# Patient Record
Sex: FEMALE
[De-identification: names, ages, dates, MRNs, and addresses within clinical notes are randomized per-mention and may not be internally consistent; named-entity substitution may affect disease eponyms.]

---

## 2020-06-03 PROBLEM — Z00.00 ENCOUNTER FOR PREVENTIVE HEALTH EXAMINATION: Status: ACTIVE | Noted: 2020-06-03

## 2020-06-04 ENCOUNTER — APPOINTMENT (OUTPATIENT)
Dept: RHEUMATOLOGY | Facility: CLINIC | Age: 50
End: 2020-06-04
Payer: COMMERCIAL

## 2020-06-04 DIAGNOSIS — Z82.61 FAMILY HISTORY OF ARTHRITIS: ICD-10-CM

## 2020-06-04 DIAGNOSIS — E11.9 TYPE 2 DIABETES MELLITUS W/OUT COMPLICATIONS: ICD-10-CM

## 2020-06-04 DIAGNOSIS — E03.9 HYPOTHYROIDISM, UNSPECIFIED: ICD-10-CM

## 2020-06-04 PROCEDURE — 99203 OFFICE O/P NEW LOW 30 MIN: CPT | Mod: 95

## 2020-06-04 RX ORDER — ACETAMINOPHEN 500 MG
TABLET ORAL
Refills: 0 | Status: ACTIVE | COMMUNITY

## 2020-06-04 RX ORDER — LEVOTHYROXINE SODIUM 137 UG/1
TABLET ORAL
Refills: 0 | Status: ACTIVE | COMMUNITY

## 2020-06-04 RX ORDER — PNV NO.95/FERROUS FUM/FOLIC AC 28MG-0.8MG
TABLET ORAL
Refills: 0 | Status: ACTIVE | COMMUNITY

## 2020-08-21 ENCOUNTER — APPOINTMENT (OUTPATIENT)
Dept: RHEUMATOLOGY | Facility: CLINIC | Age: 50
End: 2020-08-21
Payer: MEDICAID

## 2020-08-21 VITALS
HEART RATE: 83 BPM | SYSTOLIC BLOOD PRESSURE: 134 MMHG | TEMPERATURE: 99 F | BODY MASS INDEX: 29.99 KG/M2 | DIASTOLIC BLOOD PRESSURE: 82 MMHG | OXYGEN SATURATION: 97 % | HEIGHT: 65 IN | WEIGHT: 180 LBS

## 2020-08-21 PROCEDURE — 99214 OFFICE O/P EST MOD 30 MIN: CPT

## 2020-08-21 RX ORDER — DULOXETINE HYDROCHLORIDE 30 MG/1
30 CAPSULE, DELAYED RELEASE PELLETS ORAL
Qty: 30 | Refills: 2 | Status: DISCONTINUED | COMMUNITY
Start: 2020-06-04 | End: 2020-08-21

## 2020-12-01 ENCOUNTER — APPOINTMENT (OUTPATIENT)
Dept: RHEUMATOLOGY | Facility: CLINIC | Age: 50
End: 2020-12-01
Payer: MEDICAID

## 2020-12-01 VITALS
HEIGHT: 65 IN | BODY MASS INDEX: 29.99 KG/M2 | HEART RATE: 77 BPM | SYSTOLIC BLOOD PRESSURE: 136 MMHG | DIASTOLIC BLOOD PRESSURE: 82 MMHG | WEIGHT: 180 LBS | OXYGEN SATURATION: 99 % | TEMPERATURE: 99.6 F

## 2020-12-01 PROCEDURE — 99072 ADDL SUPL MATRL&STAF TM PHE: CPT

## 2020-12-01 PROCEDURE — 99214 OFFICE O/P EST MOD 30 MIN: CPT

## 2020-12-01 RX ORDER — LEVOTHYROXINE SODIUM 0.05 MG/1
50 TABLET ORAL
Qty: 30 | Refills: 0 | Status: COMPLETED | COMMUNITY
Start: 2020-09-09

## 2020-12-01 RX ORDER — METFORMIN ER 500 MG 500 MG/1
500 TABLET ORAL
Qty: 120 | Refills: 0 | Status: ACTIVE | COMMUNITY
Start: 2020-09-26

## 2020-12-01 RX ORDER — TRAZODONE HYDROCHLORIDE 50 MG/1
50 TABLET ORAL
Qty: 90 | Refills: 0 | Status: COMPLETED | COMMUNITY
Start: 2020-10-13

## 2020-12-01 RX ORDER — CETIRIZINE HYDROCHLORIDE 10 MG/1
10 TABLET, COATED ORAL
Qty: 30 | Refills: 0 | Status: COMPLETED | COMMUNITY
Start: 2020-08-23

## 2020-12-01 RX ORDER — AMOXICILLIN AND CLAVULANATE POTASSIUM 500; 125 MG/1; MG/1
500-125 TABLET, FILM COATED ORAL
Qty: 14 | Refills: 0 | Status: COMPLETED | COMMUNITY
Start: 2020-10-20

## 2020-12-01 RX ORDER — ETODOLAC 500 MG/1
500 TABLET, FILM COATED, EXTENDED RELEASE ORAL
Qty: 30 | Refills: 1 | Status: DISCONTINUED | COMMUNITY
Start: 2020-06-04 | End: 2020-12-01

## 2020-12-01 RX ORDER — LEVOTHYROXINE SODIUM 0.03 MG/1
25 TABLET ORAL
Qty: 30 | Refills: 0 | Status: COMPLETED | COMMUNITY
Start: 2020-09-26

## 2020-12-01 RX ORDER — ASPIRIN 81 MG
81 TABLET, DELAYED RELEASE (ENTERIC COATED) ORAL
Refills: 0 | Status: DISCONTINUED | COMMUNITY
End: 2020-12-01

## 2020-12-01 RX ORDER — ERGOCALCIFEROL 1.25 MG/1
1.25 MG CAPSULE, LIQUID FILLED ORAL
Qty: 8 | Refills: 0 | Status: ACTIVE | COMMUNITY
Start: 2020-09-09

## 2020-12-01 RX ORDER — LEVOTHYROXINE SODIUM 0.07 MG/1
75 TABLET ORAL
Qty: 30 | Refills: 0 | Status: ACTIVE | COMMUNITY
Start: 2020-11-27

## 2020-12-01 RX ORDER — FLUTICASONE FUROATE AND VILANTEROL TRIFENATATE 200; 25 UG/1; UG/1
200-25 POWDER RESPIRATORY (INHALATION)
Qty: 60 | Refills: 0 | Status: ACTIVE | COMMUNITY
Start: 2020-10-19

## 2020-12-01 RX ORDER — IRBESARTAN 150 MG/1
150 TABLET ORAL
Qty: 90 | Refills: 0 | Status: ACTIVE | COMMUNITY
Start: 2020-10-13

## 2020-12-01 RX ORDER — PIOGLITAZONE HYDROCHLORIDE 45 MG/1
45 TABLET ORAL
Qty: 30 | Refills: 0 | Status: COMPLETED | COMMUNITY
Start: 2020-05-07

## 2020-12-01 RX ORDER — BLOOD SUGAR DIAGNOSTIC
STRIP MISCELLANEOUS
Qty: 100 | Refills: 0 | Status: COMPLETED | COMMUNITY
Start: 2020-09-13

## 2020-12-01 RX ORDER — FLUTICASONE PROPIONATE 50 UG/1
50 SPRAY, METERED NASAL
Qty: 16 | Refills: 0 | Status: COMPLETED | COMMUNITY
Start: 2020-04-01

## 2020-12-01 RX ORDER — ASPIRIN 81 MG/1
81 TABLET, COATED ORAL
Qty: 30 | Refills: 0 | Status: ACTIVE | COMMUNITY
Start: 2020-07-11

## 2020-12-01 RX ORDER — FEXOFENADINE HYDROCHLORIDE 180 MG/1
180 TABLET ORAL
Qty: 30 | Refills: 0 | Status: COMPLETED | COMMUNITY
Start: 2020-05-10

## 2020-12-01 RX ORDER — EMPAGLIFLOZIN 25 MG/1
25 TABLET, FILM COATED ORAL
Qty: 30 | Refills: 0 | Status: ACTIVE | COMMUNITY
Start: 2020-09-06

## 2020-12-01 RX ORDER — INSULIN DEGLUDEC INJECTION 200 U/ML
200 INJECTION, SOLUTION SUBCUTANEOUS
Qty: 3 | Refills: 0 | Status: ACTIVE | COMMUNITY
Start: 2020-09-17

## 2021-01-08 NOTE — PHYSICAL EXAM
[General Appearance - Alert] : alert [General Appearance - In No Acute Distress] : in no acute distress [General Appearance - Well Nourished] : well nourished [General Appearance - Well Developed] : well developed [General Appearance - Well-Appearing] : healthy appearing [Sclera] : the sclera and conjunctiva were normal [Extraocular Movements] : extraocular movements were intact [Outer Ear] : the ears and nose were normal in appearance [Hearing Threshold Finger Rub Not [place]] : hearing was normal [Examination Of The Oral Cavity] : the lips and gums were normal [Neck Appearance] : the appearance of the neck was normal [Respiration, Rhythm And Depth] : normal respiratory rhythm and effort [FreeTextEntry1] : Tender knee ROM, tender hip flexion/external rotation [Skin Color & Pigmentation] : normal skin color and pigmentation [] : no rash [No Focal Deficits] : no focal deficits [Oriented To Time, Place, And Person] : oriented to person, place, and time [Impaired Insight] : insight and judgment were intact [Affect] : the affect was normal [Mood] : the mood was normal

## 2021-01-08 NOTE — ASSESSMENT
[FreeTextEntry1] : 50y F with chronic increasing polyarthralgia previously much more obese but recent wt loss that has plateaued due to her pain. She has a history of DM2 as well with possibly some neuropathy. Regardless, from her history and visual exam, there appears to be no inflammatory arthritis and her changes appear degenerative in nature, most likley c/w degenerative osteoarthritis of her hips and knees, mostly on weight bearing joint. She has mild hand pain at times. Labs and serologies show no systemic inflammatory autoimmune rheumatic disease process\par She has had little benefit of APAP and OTC NSAIDs for her knee symptoms. I recommend a SNRI and prn Rx NSAID to help control her pain at this time to increase mobility and well-being.\par She did not tolerate duloxetine, will give trial alternative agent.\par \par Returns now with mostly lower back pain, no worrisome symptoms, < 6 weeks. Will give NSAID as needed\par If no improvements, consider PT and XR imaging if persistent more than 3 months\par \par RTO 3 mos

## 2021-01-08 NOTE — HISTORY OF PRESENT ILLNESS
[FreeTextEntry1] : Did not tolerate SNRIs\par Now c/o mostly back pain, <6 weeks, no radiculopathy or other neuro signs\par Paraspinal spasm but mostly aching, pain 3-6/10 [de-identified] : \par \par All other ROS negative except as mentioned in HPI.

## 2021-10-01 ENCOUNTER — APPOINTMENT (OUTPATIENT)
Dept: RHEUMATOLOGY | Facility: CLINIC | Age: 51
End: 2021-10-01
Payer: MEDICAID

## 2021-10-01 VITALS
HEIGHT: 66 IN | BODY MASS INDEX: 28.61 KG/M2 | HEART RATE: 73 BPM | SYSTOLIC BLOOD PRESSURE: 146 MMHG | OXYGEN SATURATION: 98 % | WEIGHT: 178 LBS | DIASTOLIC BLOOD PRESSURE: 81 MMHG

## 2021-10-01 PROCEDURE — 99214 OFFICE O/P EST MOD 30 MIN: CPT

## 2021-10-01 NOTE — ASSESSMENT
[FreeTextEntry1] : 50y F with chronic increasing polyarthralgia previously much more obese but recent wt loss that has plateaued due to her pain. She has a history of DM2 as well with possibly some neuropathy. Regardless, from her history and visual exam, there appears to be no inflammatory arthritis and her changes appear degenerative in nature, most likley c/w degenerative osteoarthritis of her hips and knees, mostly on weight bearing joint. She has mild hand pain at times. Labs and serologies show no systemic inflammatory autoimmune rheumatic disease process\par She has had little benefit of APAP and OTC NSAIDs for her knee symptoms. I recommend a SNRI and prn Rx NSAID to help control her pain at this time to increase mobility and well-being.\par She did not tolerate duloxetine, will give trial alternative agent.\par \par Returns now with mostly lower back pain, no worrisome symptoms, 1 year. Taking methocarbamol with slight relief. Notes mild RLE radiculopathy and pain in bilateral hips. Known OA in hips and knees, appearing more radicular and component of OA as well. \par \par - XR lumbar w/ 6 view, bend/flex and ext\par - consider MR after\par - PT lumbar spine\par \par RTO 3 mos

## 2021-10-01 NOTE — PHYSICAL EXAM
[General Appearance - Alert] : alert [General Appearance - In No Acute Distress] : in no acute distress [General Appearance - Well Nourished] : well nourished [General Appearance - Well Developed] : well developed [General Appearance - Well-Appearing] : healthy appearing [Sclera] : the sclera and conjunctiva were normal [Extraocular Movements] : extraocular movements were intact [Outer Ear] : the ears and nose were normal in appearance [Hearing Threshold Finger Rub Not [place]] : hearing was normal [Examination Of The Oral Cavity] : the lips and gums were normal [Neck Appearance] : the appearance of the neck was normal [Respiration, Rhythm And Depth] : normal respiratory rhythm and effort [Skin Color & Pigmentation] : normal skin color and pigmentation [] : no rash [No Focal Deficits] : no focal deficits [Oriented To Time, Place, And Person] : oriented to person, place, and time [Impaired Insight] : insight and judgment were intact [Affect] : the affect was normal [Mood] : the mood was normal [FreeTextEntry1] : Tender knee ROM, tender hip flexion/external rotation

## 2021-10-01 NOTE — HISTORY OF PRESENT ILLNESS
[FreeTextEntry1] : Did not tolerate SNRIs\par Now c/o mostly back pain, <6 weeks, no radiculopathy or other neuro signs\par Paraspinal spasm but mostly aching, pain 3-6/10 [de-identified] : \par \par All other ROS negative except as mentioned in HPI.

## 2021-11-12 ENCOUNTER — APPOINTMENT (OUTPATIENT)
Dept: RHEUMATOLOGY | Facility: CLINIC | Age: 51
End: 2021-11-12
Payer: MEDICAID

## 2021-11-12 VITALS
RESPIRATION RATE: 18 BRPM | HEART RATE: 78 BPM | OXYGEN SATURATION: 98 % | SYSTOLIC BLOOD PRESSURE: 115 MMHG | BODY MASS INDEX: 28.12 KG/M2 | WEIGHT: 175 LBS | DIASTOLIC BLOOD PRESSURE: 73 MMHG | HEIGHT: 66 IN

## 2021-11-12 PROCEDURE — 99214 OFFICE O/P EST MOD 30 MIN: CPT

## 2021-11-26 ENCOUNTER — APPOINTMENT (OUTPATIENT)
Dept: MRI IMAGING | Facility: CLINIC | Age: 51
End: 2021-11-26
Payer: MEDICAID

## 2021-11-26 PROCEDURE — 72148 MRI LUMBAR SPINE W/O DYE: CPT

## 2021-12-17 ENCOUNTER — APPOINTMENT (OUTPATIENT)
Dept: RHEUMATOLOGY | Facility: CLINIC | Age: 51
End: 2021-12-17
Payer: MEDICAID

## 2021-12-17 VITALS
DIASTOLIC BLOOD PRESSURE: 79 MMHG | SYSTOLIC BLOOD PRESSURE: 125 MMHG | HEIGHT: 66 IN | OXYGEN SATURATION: 98 % | WEIGHT: 175 LBS | HEART RATE: 67 BPM | BODY MASS INDEX: 28.12 KG/M2 | TEMPERATURE: 96.6 F

## 2021-12-17 PROCEDURE — 99213 OFFICE O/P EST LOW 20 MIN: CPT

## 2022-02-11 ENCOUNTER — APPOINTMENT (OUTPATIENT)
Dept: RHEUMATOLOGY | Facility: CLINIC | Age: 52
End: 2022-02-11
Payer: MEDICAID

## 2022-02-11 VITALS
WEIGHT: 175 LBS | OXYGEN SATURATION: 99 % | BODY MASS INDEX: 28.12 KG/M2 | HEIGHT: 66 IN | DIASTOLIC BLOOD PRESSURE: 82 MMHG | HEART RATE: 76 BPM | SYSTOLIC BLOOD PRESSURE: 135 MMHG

## 2022-02-11 DIAGNOSIS — M79.662 PAIN IN LEFT LOWER LEG: ICD-10-CM

## 2022-02-11 PROCEDURE — 99214 OFFICE O/P EST MOD 30 MIN: CPT

## 2022-02-11 RX ORDER — NABUMETONE 750 MG/1
750 TABLET, FILM COATED ORAL
Qty: 60 | Refills: 2 | Status: DISCONTINUED | COMMUNITY
Start: 2020-12-01 | End: 2022-02-11

## 2022-05-13 ENCOUNTER — APPOINTMENT (OUTPATIENT)
Dept: RHEUMATOLOGY | Facility: CLINIC | Age: 52
End: 2022-05-13
Payer: MEDICAID

## 2022-05-13 VITALS
HEIGHT: 66 IN | BODY MASS INDEX: 28.12 KG/M2 | HEART RATE: 77 BPM | TEMPERATURE: 97.3 F | OXYGEN SATURATION: 98 % | DIASTOLIC BLOOD PRESSURE: 69 MMHG | WEIGHT: 175 LBS | SYSTOLIC BLOOD PRESSURE: 128 MMHG

## 2022-05-13 DIAGNOSIS — E66.3 OVERWEIGHT: ICD-10-CM

## 2022-05-13 PROCEDURE — 99214 OFFICE O/P EST MOD 30 MIN: CPT

## 2022-05-13 RX ORDER — VENLAFAXINE HYDROCHLORIDE 37.5 MG/1
37.5 CAPSULE, EXTENDED RELEASE ORAL
Qty: 90 | Refills: 3 | Status: DISCONTINUED | COMMUNITY
Start: 2020-08-21 | End: 2022-05-13

## 2022-05-13 RX ORDER — METHOCARBAMOL 750 MG/1
750 TABLET, FILM COATED ORAL
Qty: 30 | Refills: 0 | Status: ACTIVE | COMMUNITY
Start: 2020-11-17

## 2022-06-17 ENCOUNTER — APPOINTMENT (OUTPATIENT)
Dept: PHYSICAL MEDICINE AND REHAB | Facility: CLINIC | Age: 52
End: 2022-06-17
Payer: MEDICAID

## 2022-06-17 VITALS
SYSTOLIC BLOOD PRESSURE: 126 MMHG | WEIGHT: 175 LBS | BODY MASS INDEX: 28.12 KG/M2 | HEIGHT: 66 IN | DIASTOLIC BLOOD PRESSURE: 77 MMHG | HEART RATE: 85 BPM | OXYGEN SATURATION: 98 %

## 2022-06-17 PROCEDURE — 99204 OFFICE O/P NEW MOD 45 MIN: CPT

## 2022-06-17 RX ORDER — LOSARTAN POTASSIUM 25 MG/1
25 TABLET, FILM COATED ORAL
Refills: 0 | Status: DISCONTINUED | COMMUNITY
End: 2022-06-17

## 2022-06-17 NOTE — PHYSICAL EXAM
[FreeTextEntry1] : NAD\par A&Ox3\par Mild obesity\par ROM L-spine: 1/2 full forward flexion w/ pain (discordant); 10-15' extension w/ pain (discordant)\par ROM Hips: smooth IR/ER w/o pain\par Pelvic tilt: + left\par Seated slump test: neg left\par SLR: neg left\par MARY CARMEN's: neg left\par Right Knee: + PF crepitus\par DTR's: depressed knees; 2+ ankles\par MMT: 4+/5 R EHL; 5/5 remainder B/L LE\par Sensation: SILT.  Slight decr R L5 dermatome\par Toe & Heel Walk: Yes\par Palpation: b/l SI joints VTTP (more concordant)\par

## 2022-06-17 NOTE — DATA REVIEWED
[MRI] : MRI [FreeTextEntry1] : MRI L-spine (Nov 2021):  Grade 1 anterolisthesis at L4-L5 secondary to advanced bilateral facet arthrosis. Moderate to severe L4-5 spinal canal stenosis and moderate to severe bilateral foraminal narrowing.\par

## 2022-06-17 NOTE — ASSESSMENT
[FreeTextEntry1] : 52 y.o. F w/ h/o DM, hypothyroidism and knee/hip OA w/ chronic LS back pain w/ radiation into posterolateral thighs not past knees.  I spent most of today's office visit (35 min) reviewing the patient's MRI, discussing proposed etiology, pathogenesis and further non-operative management.  Although the patient has Gr. I anterolisthesis L4-5 -> moderate central and NF stenosis, her pain does not radiate in an L5 distribution.  It appears to be more c/w b/l SI joint dysfunction.  Discussed R/B/A to b/l SI joint injections under fluoroscopy which she has agreed to.  Pt. understands that she may have to adjust  her insulin dose accordingly.  May trial OTC Lidoderm patches as directed.  If SI joint CSI are not effective, may then consider b/l L5-S1 TFESI.  Pt. is in agreement with plan.  All questions answered.  RTC 2 weeks post procedure.

## 2022-06-17 NOTE — HISTORY OF PRESENT ILLNESS
[FreeTextEntry1] : 52 y.o. F w/ h/o DM, hypothyroidism and knee/hip OA presents to office w/ c/o CLBP w/ radiation into both legs for about two years.  Pt. denies antecedent trauma or injury to L-spine.  Pain radiates into posterolateral aspect of her calves.  Endorses N/T in both feet (R > L).  No EMG or h/o DM PN.  MRI L-spine done and reviewed below.  Pt's Rheum MD Rx'd gabapentin 300 mg which she takes bid 2' drowsiness.  Last course of P.T. was December 2021 w/o relief.  No h/o spinal injections.

## 2022-07-29 ENCOUNTER — LABORATORY RESULT (OUTPATIENT)
Age: 52
End: 2022-07-29

## 2022-07-31 ENCOUNTER — TRANSCRIPTION ENCOUNTER (OUTPATIENT)
Age: 52
End: 2022-07-31

## 2022-08-01 ENCOUNTER — OUTPATIENT (OUTPATIENT)
Dept: OUTPATIENT SERVICES | Facility: HOSPITAL | Age: 52
LOS: 1 days | End: 2022-08-01
Payer: COMMERCIAL

## 2022-08-01 ENCOUNTER — APPOINTMENT (OUTPATIENT)
Dept: PHYSICAL MEDICINE AND REHAB | Facility: GI CENTER | Age: 52
End: 2022-08-01

## 2022-08-01 DIAGNOSIS — M53.3 SACROCOCCYGEAL DISORDERS, NOT ELSEWHERE CLASSIFIED: ICD-10-CM

## 2022-08-01 LAB — GLUCOSE BLDC GLUCOMTR-MCNC: 91 MG/DL — SIGNIFICANT CHANGE UP (ref 70–99)

## 2022-08-01 PROCEDURE — 76000 FLUOROSCOPY <1 HR PHYS/QHP: CPT

## 2022-08-01 PROCEDURE — 27096 INJECT SACROILIAC JOINT: CPT | Mod: 50

## 2022-08-01 PROCEDURE — G0260: CPT | Mod: 50

## 2022-08-01 PROCEDURE — 82962 GLUCOSE BLOOD TEST: CPT

## 2022-08-14 ENCOUNTER — NON-APPOINTMENT (OUTPATIENT)
Age: 52
End: 2022-08-14

## 2022-08-19 ENCOUNTER — APPOINTMENT (OUTPATIENT)
Dept: PHYSICAL MEDICINE AND REHAB | Facility: CLINIC | Age: 52
End: 2022-08-19

## 2022-08-19 VITALS
DIASTOLIC BLOOD PRESSURE: 80 MMHG | BODY MASS INDEX: 28.28 KG/M2 | WEIGHT: 176 LBS | HEIGHT: 66 IN | OXYGEN SATURATION: 98 % | SYSTOLIC BLOOD PRESSURE: 135 MMHG | HEART RATE: 72 BPM

## 2022-08-19 PROCEDURE — 99213 OFFICE O/P EST LOW 20 MIN: CPT

## 2022-08-19 NOTE — PHYSICAL EXAM
[FreeTextEntry1] : NAD\par A&Ox3\par Mild obesity\par ROM L-spine: 1/2 full forward flexion w/o pain (improved); 15-20' extension w/o pain (improved)\par ROM Hips: smooth IR/ER w/o pain\par Pelvic tilt: + left\par Seated slump test: neg left\par SLR: neg left\par MARY CARMEN's: neg left\par Right Knee: + PF crepitus\par DTR's: depressed knees; 2+ ankles\par MMT: 4+/5 R EHL; 5/5 remainder B/L LE\par Sensation: SILT.  Slight decr R L5 dermatome\par Toe & Heel Walk: Yes\par Palpation: b/l SI joints much less TTP (improved)\par Skin: rash on buttocks resolved

## 2022-08-19 NOTE — ASSESSMENT
[FreeTextEntry1] : 52 y.o. F w/ h/o DM, hypothyroidism, knee/hip OA, Gr. I anterolisthesis L4-5 -> moderate central and NF stenosis w/ chronic LS back pain now much improved s/p B/L SI joint CSI.  I spent most of today's office visit (20 min) discussing etiology, pathogenesis and further non-operative management.  Explained that patient's rash post-procedure likely 2' skin cleaning solution w/ chlorhexadine as it did not spread beyond buttock and was inconsistent with drug rash or infection.  No need to repeat injections at this time.  May c/w OTC Lidoderm patches prn.  Pt. is in agreement with plan.  All questions answered.  RTC 3 months.

## 2022-08-19 NOTE — HISTORY OF PRESENT ILLNESS
[FreeTextEntry1] : 52 y.o. F w/ h/o DM, hypothyroidism and knee/hip OA w/ chronic LS back pain w/ radiation into posterolateral thighs returns to office for f/u s/p b/l SI joint CSI (8/1/22).  Pt. states that the rash reported across her buttocks has resolved w/ topical clobetasol cream.  Pt. reports near complete relief of her painful sxs post procedure.  She reports marked improvements in her ability to stand and walk for prolonged periods of time.

## 2022-09-02 ENCOUNTER — APPOINTMENT (OUTPATIENT)
Dept: RHEUMATOLOGY | Facility: CLINIC | Age: 52
End: 2022-09-02

## 2022-09-02 VITALS
BODY MASS INDEX: 28.12 KG/M2 | DIASTOLIC BLOOD PRESSURE: 76 MMHG | HEIGHT: 66 IN | RESPIRATION RATE: 18 BRPM | SYSTOLIC BLOOD PRESSURE: 129 MMHG | OXYGEN SATURATION: 100 % | WEIGHT: 175 LBS | HEART RATE: 66 BPM

## 2022-09-02 PROCEDURE — 99214 OFFICE O/P EST MOD 30 MIN: CPT

## 2022-09-27 ENCOUNTER — RX RENEWAL (OUTPATIENT)
Age: 52
End: 2022-09-27

## 2022-12-02 ENCOUNTER — APPOINTMENT (OUTPATIENT)
Dept: RHEUMATOLOGY | Facility: CLINIC | Age: 52
End: 2022-12-02

## 2022-12-02 VITALS
WEIGHT: 178 LBS | HEART RATE: 66 BPM | DIASTOLIC BLOOD PRESSURE: 79 MMHG | HEIGHT: 66 IN | OXYGEN SATURATION: 98 % | TEMPERATURE: 97.3 F | BODY MASS INDEX: 28.61 KG/M2 | SYSTOLIC BLOOD PRESSURE: 129 MMHG

## 2022-12-02 DIAGNOSIS — B02.29 OTHER POSTHERPETIC NERVOUS SYSTEM INVOLVEMENT: ICD-10-CM

## 2022-12-02 DIAGNOSIS — M43.10 SPONDYLOLISTHESIS, SITE UNSPECIFIED: ICD-10-CM

## 2022-12-02 DIAGNOSIS — B02.9 ZOSTER W/OUT COMPLICATIONS: ICD-10-CM

## 2022-12-02 PROCEDURE — 99214 OFFICE O/P EST MOD 30 MIN: CPT

## 2022-12-02 RX ORDER — ATORVASTATIN CALCIUM 20 MG/1
20 TABLET, FILM COATED ORAL
Qty: 30 | Refills: 0 | Status: COMPLETED | COMMUNITY
Start: 2022-08-29

## 2022-12-02 RX ORDER — ORAL SEMAGLUTIDE 14 MG/1
14 TABLET ORAL
Qty: 30 | Refills: 0 | Status: ACTIVE | COMMUNITY
Start: 2022-11-18

## 2022-12-02 RX ORDER — ORAL SEMAGLUTIDE 7 MG/1
7 TABLET ORAL
Qty: 30 | Refills: 0 | Status: COMPLETED | COMMUNITY
Start: 2022-09-20

## 2022-12-02 RX ORDER — FLUCONAZOLE 150 MG/1
150 TABLET ORAL
Qty: 2 | Refills: 0 | Status: COMPLETED | COMMUNITY
Start: 2022-10-21

## 2022-12-02 RX ORDER — FLASH GLUCOSE SENSOR
KIT MISCELLANEOUS
Qty: 2 | Refills: 0 | Status: COMPLETED | COMMUNITY
Start: 2022-09-27

## 2022-12-02 RX ORDER — CELECOXIB 200 MG/1
200 CAPSULE ORAL TWICE DAILY
Qty: 60 | Refills: 2 | Status: DISCONTINUED | COMMUNITY
Start: 2022-09-02 | End: 2022-12-02

## 2022-12-02 RX ORDER — CLOTRIMAZOLE AND BETAMETHASONE DIPROPIONATE 10; .5 MG/G; MG/G
1-0.05 CREAM TOPICAL
Qty: 45 | Refills: 0 | Status: COMPLETED | COMMUNITY
Start: 2022-10-21

## 2022-12-05 PROBLEM — M43.10 ANTEROLISTHESIS: Status: ACTIVE | Noted: 2021-11-12

## 2022-12-05 RX ORDER — LIDOCAINE 5% 700 MG/1
5 PATCH TOPICAL
Qty: 1 | Refills: 3 | Status: ACTIVE | COMMUNITY
Start: 2022-12-02

## 2022-12-06 ENCOUNTER — APPOINTMENT (OUTPATIENT)
Dept: OPHTHALMOLOGY | Facility: CLINIC | Age: 52
End: 2022-12-06

## 2023-01-13 ENCOUNTER — APPOINTMENT (OUTPATIENT)
Dept: PHYSICAL MEDICINE AND REHAB | Facility: CLINIC | Age: 53
End: 2023-01-13
Payer: MEDICAID

## 2023-01-13 VITALS
HEIGHT: 66 IN | BODY MASS INDEX: 28.61 KG/M2 | WEIGHT: 178 LBS | SYSTOLIC BLOOD PRESSURE: 148 MMHG | DIASTOLIC BLOOD PRESSURE: 76 MMHG

## 2023-01-13 VITALS
WEIGHT: 178 LBS | BODY MASS INDEX: 28.61 KG/M2 | HEIGHT: 66 IN | DIASTOLIC BLOOD PRESSURE: 82 MMHG | SYSTOLIC BLOOD PRESSURE: 146 MMHG

## 2023-01-13 DIAGNOSIS — M53.3 SACROCOCCYGEAL DISORDERS, NOT ELSEWHERE CLASSIFIED: ICD-10-CM

## 2023-01-13 PROCEDURE — 99214 OFFICE O/P EST MOD 30 MIN: CPT

## 2023-01-13 NOTE — HISTORY OF PRESENT ILLNESS
[FreeTextEntry1] : 52 y.o. F w/ h/o DM, hypothyroidism, knee/hip OA, Gr. I anterolisthesis L4-5 -> moderate central and NF stenosis w/ chronic LS back pain s/p B/L SI joint CSI (8/1/22) returns to office for f/u. States that her back pain gradually returned approximately 1 month after b/l SI joint CSI. Reports pain has returned; starts in low back and radiates down posterior Right LE to her calf; described as "shooting". Also has left low back pain with mild "aching" pain down left LE. Has long-standing numbness and tingling to b/l feet with history of diabetes. Does not report other associated numbness, tingling, saddle anesthesia, urinary/bowel incontinence. Takes Gabapentin 300 mg daily in the evening, advil PRN, Lidocaine patches PRN, and methocarbamol PRN for pain. Currently not in PT. No oral lesions; no gross abnormalities

## 2023-01-13 NOTE — PHYSICAL EXAM
[FreeTextEntry1] : NAD\par A&Ox3\par Mild obesity\par ROM L-spine: 1/2 full forward flexion w/o pain (improved); (+) pain with extension\par ROM Hips: smooth IR/ER w/o pain\par Pelvic tilt: + left\par Seated slump test: neg left, (+) Right\par SLR: +/- right; neg left\par MARY CARMEN's: +/- right (buttock/lateral hip pain)\par Right Knee: + PF crepitus\par DTR's: depressed knees; 2+ ankles\par MMT: 4+/5 R EHL; 5/5 remainder B/L LE\par Sensation: SILT.  Slight decr R L5 dermatome\par Toe & Heel Walk: Yes\par Palpation: b/l SI joints TTP (somewhat worse since last visit)\par Skin: rash on buttocks resolved

## 2023-01-13 NOTE — ASSESSMENT
[FreeTextEntry1] : 52 y.o. F w/ h/o DM, hypothyroidism, knee/hip OA, Gr. I anterolisthesis L4-5 -> moderate central and NF stenosis w/ chronic LS back pain s/p B/L SI joint CSI (8/1/22) now with resurgence of pain into R > L LE.  I spent most of today's office visit (25 min) re-reviewing the patient's old MRI, discussing etiology, pathogenesis, further diagnostic work-up and non-operative management.  As the patient's MRI is more than one year old and with resurgence of pseudo-radicular pain into legs, we will obtain updated MRI L-spine w/o to evaluate for interval worsening of her L4-5 central stenosis.  If the patient's stenosis is radiographically progressed, we may then consider b/l L5 SNRB w/o steroid for more diagnostic information.  If her stenosis is relatively unchanged, then we may consider referral for LBBs -> RFA.  Pt's medications are being managed by Rheum MD. May c/w OTC Lidoderm patches prn and gabapentin 300 mg po qhs as tolerated.  Pt. is in agreement with plan.  All questions answered.  RTC 1-2 weeks for MRI review.

## 2023-02-28 ENCOUNTER — APPOINTMENT (OUTPATIENT)
Dept: MRI IMAGING | Facility: CLINIC | Age: 53
End: 2023-02-28
Payer: MEDICAID

## 2023-02-28 PROCEDURE — 72148 MRI LUMBAR SPINE W/O DYE: CPT

## 2023-03-17 ENCOUNTER — APPOINTMENT (OUTPATIENT)
Dept: PHYSICAL MEDICINE AND REHAB | Facility: CLINIC | Age: 53
End: 2023-03-17
Payer: MEDICAID

## 2023-03-17 ENCOUNTER — APPOINTMENT (OUTPATIENT)
Dept: RHEUMATOLOGY | Facility: CLINIC | Age: 53
End: 2023-03-17
Payer: MEDICAID

## 2023-03-17 VITALS
BODY MASS INDEX: 28.93 KG/M2 | SYSTOLIC BLOOD PRESSURE: 114 MMHG | WEIGHT: 180 LBS | HEIGHT: 66 IN | DIASTOLIC BLOOD PRESSURE: 76 MMHG

## 2023-03-17 VITALS
SYSTOLIC BLOOD PRESSURE: 122 MMHG | HEART RATE: 76 BPM | BODY MASS INDEX: 28.93 KG/M2 | DIASTOLIC BLOOD PRESSURE: 79 MMHG | WEIGHT: 180 LBS | OXYGEN SATURATION: 99 % | HEIGHT: 66 IN

## 2023-03-17 PROCEDURE — 99214 OFFICE O/P EST MOD 30 MIN: CPT

## 2023-03-17 RX ORDER — MELOXICAM 15 MG/1
15 TABLET ORAL
Qty: 30 | Refills: 2 | Status: ACTIVE | COMMUNITY
Start: 2023-03-17 | End: 1900-01-01

## 2023-03-17 RX ORDER — METHYLPREDNISOLONE 4 MG/1
4 TABLET ORAL
Qty: 1 | Refills: 0 | Status: DISCONTINUED | COMMUNITY
Start: 2023-02-13 | End: 2023-03-17

## 2023-03-17 RX ORDER — IBUPROFEN 800 MG/1
800 TABLET, FILM COATED ORAL 3 TIMES DAILY
Qty: 90 | Refills: 3 | Status: DISCONTINUED | COMMUNITY
Start: 2022-12-02 | End: 2023-03-17

## 2023-03-17 NOTE — DATA REVIEWED
[MRI] : MRI [FreeTextEntry1] : MRI L-spine (Feb 2023)\par T12-L1: Minimal disc bulge. No canal or neuroforaminal stenosis. Findings are similar to the prior study.\par L1-L2: Minimal disc bulge. No canal or neuroforaminal stenosis. Findings are similar to the prior study.\par L2-L3: No canal or neuroforaminal stenosis.\par L3-L4: Minimal disc bulge. Mild bilateral facet arthrosis. No canal or neuroforaminal stenosis. Findings are similar to the prior study.\par L4-L5: Uncovering of the posterior disc space with disc bulge. Severe bilateral facet arthrosis. Moderate right and moderate/severe left neural foraminal stenosis. Moderate/severe central canal stenosis.  RIGHT FORAMINAL HNP -> R L4 NR.   \par L5-S1: Mild bilateral facet arthrosis. No canal or neuroforaminal stenosis. Findings are similar to the prior study.\par \par \par MRI L-spine (Nov 2021):  Grade 1 anterolisthesis at L4-L5 secondary to advanced bilateral facet arthrosis. Moderate to severe L4-5 spinal canal stenosis and moderate to severe bilateral foraminal narrowing.\par

## 2023-03-17 NOTE — HISTORY OF PRESENT ILLNESS
[FreeTextEntry1] : 53 y.o. F w/ h/o DM, hypothyroidism, knee/hip OA, Gr. I anterolisthesis L4-5 -> moderate central and NF stenosis w/ chronic LS back pain s/p B/L SI joint CSI (8/1/22) now with resurgence of pain into R > L LE returns to office for MRI review.  Results detailed below.  Pt. states that her right leg pain flared up around Wilson's Day.  Pt. saw Dr. Morris who Rx'd medrol dose pack which gave 1-2 days relief.  Her BS went into 300's but has since normalized.  Pain can radiate into anterior right thigh past knee into shin.  She only takes gabapentin 300 mg po qhs as more frequent dosing causes excessive daytime drowsiness.

## 2023-03-17 NOTE — ASSESSMENT
[FreeTextEntry1] : 53 y.o. F w/ h/o DM, hypothyroidism, knee/hip OA, Gr. I anterolisthesis L4-5 -> moderate central and NF stenosis w/ chronic LS back pain s/p B/L SI joint CSI (8/1/22) now with resurgence of pain into R > L LE.  I spent most of today's office visit (25 min) reviewing the patient's new MRI, discussing etiology, pathogenesis and further non-operative management.  Pt's L4-5 spondy is stable w/ advanced b/l FJ arthropathy and R > L NF stenosis.  Pt. does have progression of R L4-5 foraminal HNP -> R L4 NR which radiologist didn't mention.  Discussed R/B/A to R L4-5 TFESI under fluoroscopy which patient has agreed to.  Needs medical clearance from endocrine ME.  Pt's medications are being managed by Rheum MD.  May c/w OTC Lidoderm patches prn and gabapentin 300 mg po qhs as tolerated.  Pt. is in agreement with plan.  All questions answered.  RTC 1-2 weeks post procedure.

## 2023-03-17 NOTE — PHYSICAL EXAM
[FreeTextEntry1] : NAD\par A&Ox3\par Mild obesity\par ROM L-spine: 1/2 full forward flexion w/o pain (improved); (+) pain with extension\par ROM Hips: smooth IR/ER w/o pain\par Pelvic tilt: + left\par Seated slump test: neg left, (+) Right\par SLR: +/- right; neg left\par MARY CARMEN's: +/- right (buttock/lateral hip pain)\par Right Knee: + PF crepitus\par DTR's: depressed knees; 2+ ankles (static)\par MMT: 4/5 R TA (new since last visit); 5/5 remainder B/L LE\par Sensation: SILT.  Slight decr R L5 dermatome (static); no decrement to PP R L4 dermatome\par Toe & Heel Walk: Yes\par Palpation: b/l SI joints TTP (somewhat worse since last visit)\par

## 2023-05-07 ENCOUNTER — TRANSCRIPTION ENCOUNTER (OUTPATIENT)
Age: 53
End: 2023-05-07

## 2023-05-08 ENCOUNTER — OUTPATIENT (OUTPATIENT)
Dept: OUTPATIENT SERVICES | Facility: HOSPITAL | Age: 53
LOS: 1 days | End: 2023-05-08
Payer: COMMERCIAL

## 2023-05-08 ENCOUNTER — APPOINTMENT (OUTPATIENT)
Dept: PHYSICAL MEDICINE AND REHAB | Facility: GI CENTER | Age: 53
End: 2023-05-08
Payer: MEDICAID

## 2023-05-08 DIAGNOSIS — M54.16 RADICULOPATHY, LUMBAR REGION: ICD-10-CM

## 2023-05-08 LAB — GLUCOSE BLDC GLUCOMTR-MCNC: 99 MG/DL — SIGNIFICANT CHANGE UP (ref 70–99)

## 2023-05-08 PROCEDURE — 82962 GLUCOSE BLOOD TEST: CPT

## 2023-05-08 PROCEDURE — 64483 NJX AA&/STRD TFRM EPI L/S 1: CPT | Mod: RT

## 2023-05-08 PROCEDURE — 76000 FLUOROSCOPY <1 HR PHYS/QHP: CPT

## 2023-05-08 NOTE — PROCEDURE
[de-identified] : Date: 05/08/23\par \par PROCEDURE:\par 1) RIGHT L4-5 transforaminal epidural steroid injection\par 2) Fluoroscopic needle guidance\par \par REASON FOR PROCEDURE: RIGHT lumbar radiculopathy\par PHYSICIAN: ELIDA FIGUEROA DO\par MEDICATIONS INJECTED: 1.5 ml dexamethasone (15 mg) + 1.5 ml 1% lidocaine (total)\par LOCAL ANESTHETIC INJECTED: 4 mL 1% lidocaine\par SEDATION MEDICATIONS: per Anesthesia\par ESTIMATED BLOOD LOSS: None\par COMPLICATIONS: See below\par \par TECHNIQUE: Time-out was taken to identify the correct patient, procedure and side prior to starting the procedure. Lying in a prone position, the patient was prepped and draped in the usual sterile fashion using ChloraPrep x3 and sterile OR towels.  The area to be injected was determined under fluoroscopic guidance. The initial L4-5 transforaminal injection saw intradiscal uptake of contrast.  The needle was promptly removed.   The patient was then re-prepped for an L5-S1 transforaminal injection.  A 5.0.-inch, 22-G Quincke needle was advanced toward the 6 o’clock position of the L5 pedicle.  The needle was advanced to the final position via intermittent lateral and AP fluoroscopic images. Omnipaque 240 was then injected and showed epidural spread without vascular runoff.  After a negative aspiration of heme, the medications were then slowly injected. \par \par The procedure was completed without complications and was tolerated well.  The patient was monitored after the procedure.  The patient (or responsible party) was given post-procedure and discharge instructions to follow at home. The patient was discharged in stable condition.  A follow-up appointment was made.\par

## 2023-05-26 ENCOUNTER — APPOINTMENT (OUTPATIENT)
Dept: RHEUMATOLOGY | Facility: CLINIC | Age: 53
End: 2023-05-26
Payer: MEDICAID

## 2023-05-26 VITALS
SYSTOLIC BLOOD PRESSURE: 119 MMHG | HEIGHT: 66 IN | OXYGEN SATURATION: 100 % | DIASTOLIC BLOOD PRESSURE: 80 MMHG | HEART RATE: 72 BPM | WEIGHT: 180 LBS | BODY MASS INDEX: 28.93 KG/M2 | TEMPERATURE: 97.8 F

## 2023-05-26 DIAGNOSIS — M54.50 LOW BACK PAIN, UNSPECIFIED: ICD-10-CM

## 2023-05-26 DIAGNOSIS — M16.0 BILATERAL PRIMARY OSTEOARTHRITIS OF HIP: ICD-10-CM

## 2023-05-26 PROCEDURE — 99213 OFFICE O/P EST LOW 20 MIN: CPT | Mod: 25

## 2023-05-26 PROCEDURE — 20610 DRAIN/INJ JOINT/BURSA W/O US: CPT | Mod: 50

## 2023-05-26 RX ORDER — CALCIUM CARBONATE 160(400)MG
TABLET,CHEWABLE ORAL
Qty: 1 | Refills: 0 | Status: ACTIVE | OUTPATIENT
Start: 2023-05-26

## 2023-05-31 RX ADMIN — Medication 0 MG/6ML: at 00:00

## 2023-06-06 RX ORDER — HYLAN G-F 20 16MG/2ML
48 SYRINGE (ML) INTRAARTICULAR
Qty: 0 | Refills: 0 | Status: COMPLETED | OUTPATIENT
Start: 2023-05-31

## 2023-06-06 RX ORDER — HYLAN G-F 20 16MG/2ML
48 SYRINGE (ML) INTRAARTICULAR
Qty: 0 | Refills: 0 | Status: COMPLETED
Start: 2023-05-31

## 2023-06-09 ENCOUNTER — APPOINTMENT (OUTPATIENT)
Dept: PHYSICAL MEDICINE AND REHAB | Facility: CLINIC | Age: 53
End: 2023-06-09
Payer: MEDICAID

## 2023-06-16 ENCOUNTER — APPOINTMENT (OUTPATIENT)
Dept: PHYSICAL MEDICINE AND REHAB | Facility: CLINIC | Age: 53
End: 2023-06-16
Payer: MEDICAID

## 2023-06-16 VITALS
SYSTOLIC BLOOD PRESSURE: 126 MMHG | RESPIRATION RATE: 18 BRPM | WEIGHT: 180 LBS | HEART RATE: 62 BPM | HEIGHT: 66 IN | DIASTOLIC BLOOD PRESSURE: 76 MMHG | BODY MASS INDEX: 28.93 KG/M2

## 2023-06-16 PROCEDURE — 99214 OFFICE O/P EST MOD 30 MIN: CPT

## 2023-06-16 NOTE — ASSESSMENT
[FreeTextEntry1] : 53 y.o. F w/ h/o DM, hypothyroidism, knee/hip OA, Gr. I anterolisthesis L4-5 -> moderate central and NF stenosis w/ chronic LS back pain s/p B/L SI joint CSI (8/1/22) now s/p R L5-S1 TFESI (5/8/23).  I spent most of today's office visit (25 min) re-reviewing the patient's MRI, discussing etiology, pathogenesis and further non-operative vs. operative management.  Pt's L4-5 spondy is stable w/ advanced b/l FJ arthropathy and R > L NF stenosis.  Pt. does have progression of R L4-5 foraminal HNP -> R L4 NR but she appears to be most symptomatic from L5/S1 radiculopathy.  I explained to the patient that further LESI would not likely improve the patient's clinical condition, especially now that she has b/l DF weakness.  As such, I have referred the patient to my colleague, Dr. Fuentes, in Ortho Spine surgery.  Pt's medications are being managed by Rheum MD.  May c/w OTC Lidoderm patches prn and gabapentin 300 mg po qhs as tolerated.  Pt. is in agreement with plan.  All questions answered.  RTC after the above.

## 2023-06-16 NOTE — PHYSICAL EXAM
[FreeTextEntry1] : NAD\par A&Ox3\par Mild obesity\par ROM L-spine: 1/2 full forward flexion w/o pain (improved); (+) pain with extension\par ROM Hips: smooth IR/ER w/o pain\par Pelvic tilt: + left\par Seated slump test: neg left, (+) Right\par SLR: +/- right; neg left\par MARY CARMEN's: +/- right (buttock/lateral hip pain)\par Right Knee: + PF crepitus\par DTR's: depressed knees; 2+ ankles (static); no ankle clonus\par MMT:  4-/5 R TA and 4/5 L TA (new since last visit) 5/5 remainder B/L LE\par Sensation: SILT.  Slight decr R L5 dermatome (static); no decrement to PP R L4 dermatome\par Toe & Heel Walk: Yes\par Palpation: b/l SI joints TTP (static)\par

## 2023-06-16 NOTE — HISTORY OF PRESENT ILLNESS
[FreeTextEntry1] : 53 y.o. F w/ h/o DM, hypothyroidism, knee/hip OA, Gr. I anterolisthesis L4-5 -> moderate central and NF stenosis w/ chronic LS back pain s/p B/L SI joint CSI (8/1/22) now with resurgence of pain into R > L LE s/p R L5-S1 TFESI (5/8/23).  Patient had pain relief of 90% from for 2-3 and 50% for 4-5 days, but pain returned thereafter and is now back to her baseline pain level. She still endorses back pain radiating down the leg to her toes right side. She feels electrical shooting pain when she extends her back. She takes ibuprofen in the AM, as well as gabapentin 300mg qhs, sometimes in the AM when her pain is unbearable. Occasionally takes methocarbamol at night.

## 2023-08-25 ENCOUNTER — APPOINTMENT (OUTPATIENT)
Dept: RHEUMATOLOGY | Facility: CLINIC | Age: 53
End: 2023-08-25
Payer: MEDICAID

## 2023-08-25 VITALS
TEMPERATURE: 97.4 F | OXYGEN SATURATION: 98 % | BODY MASS INDEX: 28.93 KG/M2 | DIASTOLIC BLOOD PRESSURE: 74 MMHG | SYSTOLIC BLOOD PRESSURE: 127 MMHG | HEART RATE: 69 BPM | HEIGHT: 66 IN | WEIGHT: 180 LBS

## 2023-08-25 DIAGNOSIS — S69.90XA UNSPECIFIED INJURY OF UNSPECIFIED WRIST, HAND AND FINGER(S), INITIAL ENCOUNTER: ICD-10-CM

## 2023-08-25 PROCEDURE — 99214 OFFICE O/P EST MOD 30 MIN: CPT

## 2023-08-25 NOTE — HISTORY OF PRESENT ILLNESS
[___ Month(s) Ago] : [unfilled] month(s) ago [FreeTextEntry1] : - s/p Synvisc One in bilateral knees last visit 5/2023, next due 11/2023 - gabapentin has been increased at bedtime dose. - was recommended for surgical evaluation since medical mgmt and PT has failed, injection treatment has failed with SI/lumbar - fall due to unsteady gait, landed on R hand, injury to hand and fingers #4-5. pain persists and decreased flexion, tenderness.  Fall was on  - ROS remains unchanged otherwise, no new meds.

## 2023-08-25 NOTE — PROCEDURE
General [Today's Date:] : Date: [unfilled] [Arthrocentesis] : arthrocentesis was performed [Risks] : risks [Benefits] : benefits [Alternatives] : alternatives [Consent Obtained] : written consent was obtained prior to the procedure and is detailed in the patient's record [Patient] : Prior to the start of the procedure a time out was taken and the identity of the patient was confirmed via name and date of birth with the patient. The correct site and the procedure to be performed were confirmed. The correct side was confirmed if applicable. The availability of the correct equipment was verified [Therapeutic] : therapeutic [#1 Site: ______] : #1 site identified in the [unfilled] [Chlorhexidine] : chlorhexidine [#2 Site: ___] : # 2 site identified in the [unfilled] [Ethyl Chloride] : ethyl chloride [22 gauge 1.5 inch] : A 22 gauge 1.5 inch needle was used [___ml Visccosupplementation] : [unfilled] ml of viscosupplementation [Tolerated Well] : the patient tolerated the procedure well [No Complications] : there were no complications [Instructions Given] : handouts/patient instructions were given to patient [Patient Instructed to Call] : patient was instructed to call if redness at site, a decrease in range of motion or an increase in pain is noted after procedure. [de-identified] : Synvisc One 48mg/6mL [FreeTextEntry1] : Injected Synvisc One 60mg into each knee successfully today. Pt tolerated injection well. \par  Instructed to ice 15 min off/on over 3-4 hours this evening for any pain secondary to injection or post-injection inflammatory response. May take APAP or NSAID as needed after injection for any post-injection pain/swelling. \par  \par

## 2023-08-25 NOTE — PHYSICAL EXAM
[General Appearance - Alert] : alert [General Appearance - In No Acute Distress] : in no acute distress [General Appearance - Well Nourished] : well nourished [General Appearance - Well Developed] : well developed [General Appearance - Well-Appearing] : healthy appearing [Sclera] : the sclera and conjunctiva were normal [Extraocular Movements] : extraocular movements were intact [Outer Ear] : the ears and nose were normal in appearance [Hearing Threshold Finger Rub Not [place]] : hearing was normal [Examination Of The Oral Cavity] : the lips and gums were normal [Neck Appearance] : the appearance of the neck was normal [Respiration, Rhythm And Depth] : normal respiratory rhythm and effort [Skin Color & Pigmentation] : normal skin color and pigmentation [] : no rash [No Focal Deficits] : no focal deficits [Oriented To Time, Place, And Person] : oriented to person, place, and time [Impaired Insight] : insight and judgment were intact [Affect] : the affect was normal [Mood] : the mood was normal [FreeTextEntry1] : Tender knee ROM, tender hip flexion/external rotation

## 2023-10-16 ENCOUNTER — NON-APPOINTMENT (OUTPATIENT)
Age: 53
End: 2023-10-16

## 2023-10-17 ENCOUNTER — APPOINTMENT (OUTPATIENT)
Dept: OPHTHALMOLOGY | Facility: CLINIC | Age: 53
End: 2023-10-17
Payer: MEDICAID

## 2023-10-17 PROCEDURE — 92250 FUNDUS PHOTOGRAPHY W/I&R: CPT

## 2023-10-17 PROCEDURE — 92004 COMPRE OPH EXAM NEW PT 1/>: CPT | Mod: 25

## 2023-11-17 ENCOUNTER — APPOINTMENT (OUTPATIENT)
Dept: RHEUMATOLOGY | Facility: CLINIC | Age: 53
End: 2023-11-17
Payer: MEDICAID

## 2023-11-17 VITALS
DIASTOLIC BLOOD PRESSURE: 81 MMHG | HEIGHT: 66 IN | HEART RATE: 71 BPM | SYSTOLIC BLOOD PRESSURE: 125 MMHG | BODY MASS INDEX: 30.22 KG/M2 | WEIGHT: 188 LBS | OXYGEN SATURATION: 98 %

## 2023-11-17 DIAGNOSIS — R53.81 OTHER MALAISE: ICD-10-CM

## 2023-11-17 PROCEDURE — 99214 OFFICE O/P EST MOD 30 MIN: CPT | Mod: 25

## 2023-11-17 PROCEDURE — 20610 DRAIN/INJ JOINT/BURSA W/O US: CPT | Mod: 50

## 2023-11-17 RX ORDER — GABAPENTIN 300 MG/1
300 CAPSULE ORAL
Qty: 120 | Refills: 3 | Status: ACTIVE | COMMUNITY
Start: 2021-10-01 | End: 1900-01-01

## 2023-11-17 RX ORDER — HYLAN G-F 20 16MG/2ML
48 SYRINGE (ML) INTRAARTICULAR
Refills: 0 | Status: COMPLETED
Start: 2023-11-17

## 2023-11-17 RX ORDER — HYLAN G-F 20 16MG/2ML
48 SYRINGE (ML) INTRAARTICULAR
Refills: 0 | Status: COMPLETED | OUTPATIENT
Start: 2023-11-17

## 2023-11-17 RX ADMIN — Medication 0 MG/6ML: at 00:00

## 2024-02-23 ENCOUNTER — APPOINTMENT (OUTPATIENT)
Dept: RHEUMATOLOGY | Facility: CLINIC | Age: 54
End: 2024-02-23
Payer: MEDICAID

## 2024-02-23 VITALS
TEMPERATURE: 97.3 F | SYSTOLIC BLOOD PRESSURE: 122 MMHG | HEART RATE: 56 BPM | HEIGHT: 66 IN | BODY MASS INDEX: 28.93 KG/M2 | OXYGEN SATURATION: 96 % | DIASTOLIC BLOOD PRESSURE: 83 MMHG | WEIGHT: 180 LBS

## 2024-02-23 DIAGNOSIS — M48.062 SPINAL STENOSIS, LUMBAR REGION WITH NEUROGENIC CLAUDICATION: ICD-10-CM

## 2024-02-23 DIAGNOSIS — M17.0 BILATERAL PRIMARY OSTEOARTHRITIS OF KNEE: ICD-10-CM

## 2024-02-23 DIAGNOSIS — M15.9 POLYOSTEOARTHRITIS, UNSPECIFIED: ICD-10-CM

## 2024-02-23 DIAGNOSIS — M25.50 PAIN IN UNSPECIFIED JOINT: ICD-10-CM

## 2024-02-23 DIAGNOSIS — M54.16 RADICULOPATHY, LUMBAR REGION: ICD-10-CM

## 2024-02-23 PROCEDURE — 99214 OFFICE O/P EST MOD 30 MIN: CPT

## 2024-02-23 RX ORDER — HYALURONATE SODIUM, STABILIZED 60 MG/3 ML
60 SYRINGE (ML) INTRAARTICULAR
Qty: 2 | Refills: 0 | Status: DISCONTINUED | COMMUNITY
Start: 2023-03-17 | End: 2024-02-23

## 2024-03-29 RX ORDER — PREDNISONE 10 MG/1
10 TABLET ORAL
Qty: 20 | Refills: 0 | Status: ACTIVE | COMMUNITY
Start: 2022-06-01 | End: 1900-01-01

## 2024-08-01 ENCOUNTER — APPOINTMENT (OUTPATIENT)
Dept: RHEUMATOLOGY | Facility: CLINIC | Age: 54
End: 2024-08-01

## 2024-08-01 VITALS
BODY MASS INDEX: 28.93 KG/M2 | HEART RATE: 81 BPM | OXYGEN SATURATION: 98 % | HEIGHT: 66 IN | DIASTOLIC BLOOD PRESSURE: 78 MMHG | WEIGHT: 180 LBS | TEMPERATURE: 97.5 F | SYSTOLIC BLOOD PRESSURE: 130 MMHG

## 2024-08-01 DIAGNOSIS — R20.0 ANESTHESIA OF SKIN: ICD-10-CM

## 2024-08-01 DIAGNOSIS — R53.81 OTHER MALAISE: ICD-10-CM

## 2024-08-01 DIAGNOSIS — M17.0 BILATERAL PRIMARY OSTEOARTHRITIS OF KNEE: ICD-10-CM

## 2024-08-01 DIAGNOSIS — M54.16 RADICULOPATHY, LUMBAR REGION: ICD-10-CM

## 2024-08-01 DIAGNOSIS — M15.0 PRIMARY GENERALIZED (OSTEO)ARTHRITIS: ICD-10-CM

## 2024-08-01 PROCEDURE — 20610 DRAIN/INJ JOINT/BURSA W/O US: CPT | Mod: 50

## 2024-08-01 PROCEDURE — 99204 OFFICE O/P NEW MOD 45 MIN: CPT | Mod: 25

## 2024-08-02 PROBLEM — M15.0 PRIMARY OSTEOARTHRITIS INVOLVING MULTIPLE JOINTS: Status: ACTIVE | Noted: 2020-06-04

## 2024-08-02 RX ORDER — HYLAN G-F 20 16MG/2ML
48 SYRINGE (ML) INTRAARTICULAR
Refills: 0 | Status: COMPLETED | OUTPATIENT
Start: 2024-08-02

## 2024-08-02 RX ADMIN — Medication 0 MG/6ML: at 00:00

## 2024-08-06 ENCOUNTER — APPOINTMENT (OUTPATIENT)
Dept: RADIOLOGY | Facility: CLINIC | Age: 54
End: 2024-08-06

## 2024-08-06 ENCOUNTER — RESULT REVIEW (OUTPATIENT)
Age: 54
End: 2024-08-06

## 2024-08-06 PROCEDURE — 73565 X-RAY EXAM OF KNEES: CPT

## 2024-08-06 PROCEDURE — 72052 X-RAY EXAM NECK SPINE 6/>VWS: CPT

## 2024-08-06 PROCEDURE — 72070 X-RAY EXAM THORAC SPINE 2VWS: CPT

## 2024-08-06 PROCEDURE — 72110 X-RAY EXAM L-2 SPINE 4/>VWS: CPT

## 2024-08-06 PROCEDURE — 73110 X-RAY EXAM OF WRIST: CPT | Mod: 50

## 2024-08-06 PROCEDURE — 73130 X-RAY EXAM OF HAND: CPT | Mod: 50

## 2024-10-17 ENCOUNTER — APPOINTMENT (OUTPATIENT)
Dept: RHEUMATOLOGY | Facility: CLINIC | Age: 54
End: 2024-10-17
Payer: MEDICARE

## 2024-10-17 VITALS — OXYGEN SATURATION: 97 % | DIASTOLIC BLOOD PRESSURE: 73 MMHG | HEART RATE: 75 BPM | SYSTOLIC BLOOD PRESSURE: 122 MMHG

## 2024-10-17 DIAGNOSIS — R53.81 OTHER MALAISE: ICD-10-CM

## 2024-10-17 DIAGNOSIS — M54.16 RADICULOPATHY, LUMBAR REGION: ICD-10-CM

## 2024-10-17 DIAGNOSIS — M15.0 PRIMARY GENERALIZED (OSTEO)ARTHRITIS: ICD-10-CM

## 2024-10-17 DIAGNOSIS — M43.10 SPONDYLOLISTHESIS, SITE UNSPECIFIED: ICD-10-CM

## 2024-10-17 DIAGNOSIS — M25.50 PAIN IN UNSPECIFIED JOINT: ICD-10-CM

## 2024-10-17 DIAGNOSIS — M17.0 BILATERAL PRIMARY OSTEOARTHRITIS OF KNEE: ICD-10-CM

## 2024-10-17 PROCEDURE — G2211 COMPLEX E/M VISIT ADD ON: CPT

## 2024-10-17 PROCEDURE — 99214 OFFICE O/P EST MOD 30 MIN: CPT

## 2024-10-18 RX ORDER — METAXALONE 800 MG/1
800 TABLET ORAL 3 TIMES DAILY
Qty: 90 | Refills: 1 | Status: ACTIVE | COMMUNITY
Start: 2024-10-17

## 2024-10-22 ENCOUNTER — APPOINTMENT (OUTPATIENT)
Dept: OPHTHALMOLOGY | Facility: CLINIC | Age: 54
End: 2024-10-22
Payer: MEDICARE

## 2024-10-22 ENCOUNTER — NON-APPOINTMENT (OUTPATIENT)
Age: 54
End: 2024-10-22

## 2024-10-22 PROCEDURE — 76514 ECHO EXAM OF EYE THICKNESS: CPT

## 2024-10-22 PROCEDURE — 92004 COMPRE OPH EXAM NEW PT 1/>: CPT

## 2024-10-22 PROCEDURE — 92133 CPTRZD OPH DX IMG PST SGM ON: CPT

## 2024-10-29 PROBLEM — M79.672 LEFT FOOT PAIN: Status: ACTIVE | Noted: 2024-10-29

## 2024-11-08 DIAGNOSIS — M79.672 PAIN IN LEFT FOOT: ICD-10-CM

## 2024-11-11 ENCOUNTER — APPOINTMENT (OUTPATIENT)
Dept: OPHTHALMOLOGY | Facility: CLINIC | Age: 54
End: 2024-11-11
Payer: MEDICARE

## 2024-11-11 ENCOUNTER — NON-APPOINTMENT (OUTPATIENT)
Age: 54
End: 2024-11-11

## 2024-11-11 PROCEDURE — 92083 EXTENDED VISUAL FIELD XM: CPT

## 2024-11-20 ENCOUNTER — APPOINTMENT (OUTPATIENT)
Dept: CT IMAGING | Facility: CLINIC | Age: 54
End: 2024-11-20
Payer: MEDICARE

## 2024-11-20 PROCEDURE — 73700 CT LOWER EXTREMITY W/O DYE: CPT | Mod: LT

## 2025-02-07 ENCOUNTER — APPOINTMENT (OUTPATIENT)
Dept: RHEUMATOLOGY | Facility: CLINIC | Age: 55
End: 2025-02-07

## 2025-03-05 ENCOUNTER — NON-APPOINTMENT (OUTPATIENT)
Age: 55
End: 2025-03-05

## 2025-03-07 ENCOUNTER — APPOINTMENT (OUTPATIENT)
Dept: RHEUMATOLOGY | Facility: CLINIC | Age: 55
End: 2025-03-07

## 2025-03-07 VITALS — DIASTOLIC BLOOD PRESSURE: 69 MMHG | HEART RATE: 115 BPM | OXYGEN SATURATION: 100 % | SYSTOLIC BLOOD PRESSURE: 123 MMHG

## 2025-03-07 DIAGNOSIS — M54.16 RADICULOPATHY, LUMBAR REGION: ICD-10-CM

## 2025-03-07 DIAGNOSIS — M25.562 PAIN IN LEFT KNEE: ICD-10-CM

## 2025-03-07 DIAGNOSIS — M17.0 BILATERAL PRIMARY OSTEOARTHRITIS OF KNEE: ICD-10-CM

## 2025-03-07 DIAGNOSIS — M15.0 PRIMARY GENERALIZED (OSTEO)ARTHRITIS: ICD-10-CM

## 2025-03-07 DIAGNOSIS — M54.50 LOW BACK PAIN, UNSPECIFIED: ICD-10-CM

## 2025-03-07 PROCEDURE — 20610 DRAIN/INJ JOINT/BURSA W/O US: CPT | Mod: 50

## 2025-03-07 PROCEDURE — 99214 OFFICE O/P EST MOD 30 MIN: CPT | Mod: 25

## 2025-03-07 RX ORDER — HYLAN G-F 20 16MG/2ML
48 SYRINGE (ML) INTRAARTICULAR
Refills: 0 | Status: COMPLETED | OUTPATIENT
Start: 2025-03-07

## 2025-03-07 RX ORDER — METHYLPRED ACET/NACL,ISO-OS/PF 40 MG/ML
40 VIAL (ML) INJECTION
Refills: 0 | Status: COMPLETED | OUTPATIENT
Start: 2025-03-07

## 2025-03-07 RX ORDER — LIDOCAINE HYDROCHLORIDE 10 MG/ML
1 INJECTION, SOLUTION INFILTRATION; PERINEURAL
Refills: 0 | Status: COMPLETED | OUTPATIENT
Start: 2025-03-07

## 2025-03-07 RX ADMIN — LIDOCAINE HYDROCHLORIDE %: 10 INJECTION, SOLUTION INFILTRATION; PERINEURAL at 00:00

## 2025-03-07 RX ADMIN — Medication 0 MG/6ML: at 00:00

## 2025-03-07 RX ADMIN — METHYLPREDNISOLONE ACETATE MG/ML: 40 INJECTION, SUSPENSION INTRA-ARTICULAR; INTRALESIONAL; INTRAMUSCULAR; SOFT TISSUE at 00:00

## 2025-03-13 ENCOUNTER — NON-APPOINTMENT (OUTPATIENT)
Age: 55
End: 2025-03-13

## 2025-03-13 RX ORDER — PREDNISONE 5 MG/1
5 TABLET ORAL
Qty: 30 | Refills: 0 | Status: ACTIVE | COMMUNITY
Start: 2025-03-13 | End: 1900-01-01

## 2025-04-22 ENCOUNTER — APPOINTMENT (OUTPATIENT)
Dept: OPHTHALMOLOGY | Facility: CLINIC | Age: 55
End: 2025-04-22
Payer: MEDICARE

## 2025-04-22 ENCOUNTER — NON-APPOINTMENT (OUTPATIENT)
Age: 55
End: 2025-04-22

## 2025-04-22 PROCEDURE — 92250 FUNDUS PHOTOGRAPHY W/I&R: CPT

## 2025-04-22 PROCEDURE — 92014 COMPRE OPH EXAM EST PT 1/>: CPT

## 2025-06-09 ENCOUNTER — APPOINTMENT (OUTPATIENT)
Dept: RHEUMATOLOGY | Facility: CLINIC | Age: 55
End: 2025-06-09
Payer: MEDICARE

## 2025-06-09 VITALS
HEART RATE: 71 BPM | HEIGHT: 66 IN | BODY MASS INDEX: 28.93 KG/M2 | SYSTOLIC BLOOD PRESSURE: 111 MMHG | WEIGHT: 180 LBS | OXYGEN SATURATION: 97 % | DIASTOLIC BLOOD PRESSURE: 77 MMHG
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
## 2025-06-09 PROCEDURE — 99214 OFFICE O/P EST MOD 30 MIN: CPT

## 2025-06-09 PROCEDURE — G2211 COMPLEX E/M VISIT ADD ON: CPT

## 2025-09-08 ENCOUNTER — APPOINTMENT (OUTPATIENT)
Dept: RHEUMATOLOGY | Facility: CLINIC | Age: 55
End: 2025-09-08

## 2025-09-08 VITALS — HEART RATE: 68 BPM | SYSTOLIC BLOOD PRESSURE: 121 MMHG | OXYGEN SATURATION: 98 % | DIASTOLIC BLOOD PRESSURE: 78 MMHG

## 2025-09-08 DIAGNOSIS — R53.81 OTHER MALAISE: ICD-10-CM

## 2025-09-08 DIAGNOSIS — M17.0 BILATERAL PRIMARY OSTEOARTHRITIS OF KNEE: ICD-10-CM

## 2025-09-08 DIAGNOSIS — M15.0 PRIMARY GENERALIZED (OSTEO)ARTHRITIS: ICD-10-CM

## 2025-09-08 DIAGNOSIS — M16.0 BILATERAL PRIMARY OSTEOARTHRITIS OF HIP: ICD-10-CM

## 2025-09-08 DIAGNOSIS — M54.16 RADICULOPATHY, LUMBAR REGION: ICD-10-CM

## 2025-09-08 PROCEDURE — 99214 OFFICE O/P EST MOD 30 MIN: CPT | Mod: 25

## 2025-09-08 PROCEDURE — 20610 DRAIN/INJ JOINT/BURSA W/O US: CPT | Mod: 50

## 2025-09-08 RX ORDER — CELECOXIB 200 MG/1
200 CAPSULE ORAL TWICE DAILY
Qty: 180 | Refills: 1 | Status: ACTIVE | COMMUNITY
Start: 2025-09-08 | End: 1900-01-01

## 2025-09-08 RX ORDER — HYLAN G-F 20 16MG/2ML
48 SYRINGE (ML) INTRAARTICULAR
Refills: 0 | Status: COMPLETED | OUTPATIENT
Start: 2025-09-08

## 2025-09-08 RX ADMIN — Medication 0 MG/6ML: at 00:00

## 2025-09-11 RX ORDER — PREDNISONE 5 MG/1
5 TABLET ORAL
Qty: 30 | Refills: 0 | Status: ACTIVE | COMMUNITY
Start: 2025-09-11 | End: 1900-01-01

## (undated) DEVICE — OMNIPAQUE 180MG/ML 10ML 10/BX